# Patient Record
Sex: FEMALE | URBAN - METROPOLITAN AREA
[De-identification: names, ages, dates, MRNs, and addresses within clinical notes are randomized per-mention and may not be internally consistent; named-entity substitution may affect disease eponyms.]

---

## 2018-05-31 ENCOUNTER — APPOINTMENT (RX ONLY)
Dept: URBAN - METROPOLITAN AREA CLINIC 23 | Facility: CLINIC | Age: 78
Setting detail: DERMATOLOGY
End: 2018-05-31

## 2018-05-31 DIAGNOSIS — Z41.9 ENCOUNTER FOR PROCEDURE FOR PURPOSES OTHER THAN REMEDYING HEALTH STATE, UNSPECIFIED: ICD-10-CM

## 2018-05-31 PROBLEM — Z85.820 PERSONAL HISTORY OF MALIGNANT MELANOMA OF SKIN: Status: ACTIVE | Noted: 2018-05-31

## 2018-05-31 PROBLEM — Z85.828 PERSONAL HISTORY OF OTHER MALIGNANT NEOPLASM OF SKIN: Status: ACTIVE | Noted: 2018-05-31

## 2018-05-31 PROBLEM — M12.9 ARTHROPATHY, UNSPECIFIED: Status: ACTIVE | Noted: 2018-05-31

## 2018-05-31 PROCEDURE — ? IN-HOUSE DISPENSING PHARMACY

## 2018-05-31 PROCEDURE — ? ADDITIONAL NOTES

## 2018-05-31 NOTE — PROCEDURE: MIPS QUALITY
Quality 111:Pneumonia Vaccination Status For Older Adults: Pneumococcal Vaccination Previously Received
Detail Level: Zone
Quality 224: Stage 0-Iic Melanoma: Overutilization Of Imaging Studies For Only Stage 0-Iic Melanoma: None of the following diagnostic imaging studies ordered: chest X-ray, CT, Ultrasound, MRI, PET, or nuclear medicine scans (ML)
Quality 110: Preventive Care And Screening: Influenza Immunization: Influenza Immunization Administered during Influenza season

## 2018-05-31 NOTE — PROCEDURE: ADDITIONAL NOTES
Additional Notes: Discussed hypo-pigmentation on patientâs cheek\\nAlso discussed dissolving fillers patient had in the past which she is unhappy with due to [facility] under the skin on the right cheek

## 2018-05-31 NOTE — PROCEDURE: IN-HOUSE DISPENSING PHARMACY
Product 60 Refills: 0
Product 18 Unit Type: mg
Send Charges To Patient Encounter: No
Name Of Product 6: Valtrex
Product 1 Units Dispensed: 1
Product 5 Amount/Unit (Numbers Only): 3
Product 3 Unit Type: jar(s)
Product 1 Unit Type: tube(s)
Product 4 Application Directions: Apply pea sized amount to entire face at bedtime
Product 2 Application Directions: Apply 1 pad to face at bedtime
Product 6 Unit Type: tablets
Name Of Product 2: Hydroquinone pads 6%
Product 3 Application Directions: Apply 1 pad to face 2x usman
Product 6 Application Directions: Take one pill in AM and PM x3 days
Name Of Product 5: Predni
Detail Level: Zone
Product 1 Price/Unit (In Dollars): 0.00
Product 1 Application Directions: Apply thin layer to affected area at bedtime
Product 5 Application Directions: Take one tablet 20 mg # 1 dispense in office today, take one tablet 10 mg # 1 day 2, take one tablet 10 mg # 1 Day 3 and resume
Product 6 Amount/Unit (Numbers Only): 6
Name Of Product 4: Tretinoin 0.05%
Name Of Product 1: Hydroquinone 4% / Tretinoin 0.05% / Fluocinolone 0.01%